# Patient Record
Sex: MALE | Race: WHITE | NOT HISPANIC OR LATINO | Employment: OTHER | URBAN - METROPOLITAN AREA
[De-identification: names, ages, dates, MRNs, and addresses within clinical notes are randomized per-mention and may not be internally consistent; named-entity substitution may affect disease eponyms.]

---

## 2019-12-04 ENCOUNTER — TELEPHONE (OUTPATIENT)
Dept: GASTROENTEROLOGY | Facility: CLINIC | Age: 84
End: 2019-12-04

## 2019-12-04 NOTE — TELEPHONE ENCOUNTER
Called pt's wife back, discussed changing the medication to Pepcid 20 mg as is taking the Zantac 150 mg   Pt's last ov was 7/2017 so recommened he make an ov first to discuss this and she is agreeable  Transferred to

## 2019-12-04 NOTE — TELEPHONE ENCOUNTER
Pt's wife left  mssg stating her luizabd got a ntc that Ranitidine was recalled; asks for [phone] to discuss what he can take in place of it

## 2019-12-19 ENCOUNTER — OFFICE VISIT (OUTPATIENT)
Dept: GASTROENTEROLOGY | Facility: CLINIC | Age: 84
End: 2019-12-19
Payer: MEDICARE

## 2019-12-19 VITALS
WEIGHT: 170 LBS | HEIGHT: 70 IN | HEART RATE: 94 BPM | DIASTOLIC BLOOD PRESSURE: 82 MMHG | BODY MASS INDEX: 24.34 KG/M2 | SYSTOLIC BLOOD PRESSURE: 128 MMHG

## 2019-12-19 DIAGNOSIS — K21.9 GASTROESOPHAGEAL REFLUX DISEASE WITHOUT ESOPHAGITIS: Primary | ICD-10-CM

## 2019-12-19 DIAGNOSIS — D75.89 MACROCYTOSIS: ICD-10-CM

## 2019-12-19 DIAGNOSIS — K22.70 BARRETT'S ESOPHAGUS WITHOUT DYSPLASIA: ICD-10-CM

## 2019-12-19 DIAGNOSIS — K29.50 OTHER CHRONIC GASTRITIS WITHOUT HEMORRHAGE: ICD-10-CM

## 2019-12-19 PROBLEM — I25.810 CORONARY ARTERY DISEASE INVOLVING AUTOLOGOUS ARTERY CORONARY BYPASS GRAFT WITHOUT ANGINA PECTORIS: Status: ACTIVE | Noted: 2019-12-19

## 2019-12-19 PROBLEM — K29.70 GASTRITIS: Status: ACTIVE | Noted: 2019-12-19

## 2019-12-19 PROCEDURE — 99214 OFFICE O/P EST MOD 30 MIN: CPT | Performed by: INTERNAL MEDICINE

## 2019-12-19 RX ORDER — ASPIRIN 81 MG/1
81 TABLET ORAL EVERY OTHER DAY
COMMUNITY

## 2019-12-19 RX ORDER — MELATONIN
1000 DAILY
COMMUNITY

## 2019-12-19 RX ORDER — ATORVASTATIN CALCIUM 10 MG/1
40 TABLET, FILM COATED ORAL DAILY
COMMUNITY

## 2019-12-19 RX ORDER — VERAPAMIL HYDROCHLORIDE 80 MG/1
240 TABLET ORAL DAILY
COMMUNITY

## 2019-12-19 RX ORDER — FAMOTIDINE 40 MG/1
40 TABLET, FILM COATED ORAL
Qty: 90 TABLET | Refills: 3 | Status: SHIPPED | OUTPATIENT
Start: 2019-12-19

## 2019-12-19 RX ORDER — LISINOPRIL 10 MG/1
10 TABLET ORAL DAILY
COMMUNITY

## 2019-12-19 RX ORDER — MULTIVITAMIN
1 CAPSULE ORAL DAILY
COMMUNITY

## 2019-12-19 RX ORDER — LEVOTHYROXINE SODIUM 0.1 MG/1
75 TABLET ORAL DAILY
COMMUNITY

## 2019-12-19 RX ORDER — OCTISALATE, AVOBENZONE, HOMOSALATE, AND OCTOCRYLENE 29.4; 29.4; 49; 25.48 MG/ML; MG/ML; MG/ML; MG/ML
LOTION TOPICAL EVERY OTHER DAY
COMMUNITY

## 2019-12-19 RX ORDER — PANTOPRAZOLE SODIUM 40 MG/1
40 TABLET, DELAYED RELEASE ORAL DAILY
COMMUNITY

## 2019-12-19 NOTE — PATIENT INSTRUCTIONS
[facility] - Outpatient Follow-up Note  Jason Tejeda 80 y o  male MRN: [id number]  Encounter: [id number]    ASSESSMENT AND PLAN:      1  Gastroesophageal reflux disease without esophagitis  -Patient with a history of gastroesophageal reflux and previous history of dysphagia  Overall he is doing well from that point of view  He is on pantoprazole 40 mg daily  He ran out of his Zantac and stopped taking it because of warnings about adverse effects or cancer promoting affects  Has not had worse symptoms since being off  Could consider just taking the famotidine as needed only    - famotidine (PEPCID) 40 MG tablet; Take 1 tablet (40 mg total) by mouth daily at bedtime as needed for heartburn  Dispense: 90 tablet; Refill: 3    2  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

## 2019-12-19 NOTE — LETTER
December 19, 2019     Lawrence Haile MD  [address]    Patient: Tsering Lane   YOB: 1932   Date of Visit: 12/19/2019       Dear Dr Rina Grigsby Recipients: Thank you for referring Joao Morin to me for evaluation  Below are my notes for this consultation  If you have questions, please do not hesitate to call me  I look forward to following your patient along with you  Sincerely,        Babita Almazan MD        CC: No Recipients  Babita Almazan MD  12/19/2019  3:38 PM  Incomplete  [address] [facility] - Outpatient Follow-up Note  Tsering Lane 80 y o  male MRN: [id number]  Encounter: [id number]    ASSESSMENT AND PLAN:      1  Gastroesophageal reflux disease without esophagitis  -Patient with a history of gastroesophageal reflux and previous history of dysphagia  Overall he is doing well from that point of view  He is on pantoprazole 40 mg daily  He ran out of his Zantac and stopped taking it because of warnings about adverse effects or cancer promoting affects  Has not had worse symptoms since being off  Could consider just taking the famotidine as needed only    - famotidine (PEPCID) 40 MG tablet; Take 1 tablet (40 mg total) by mouth daily at bedtime as needed for heartburn  Dispense: 90 tablet; Refill: 3    2  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  ______________________________________________________________________    Chief Complaint   Patient presents with    Med refill, needs meds changed from zantac, GERD     HPI:   Patient returns to the office for follow-up visit with his wife  We have been taking care of him for gastroesophageal reflux symptoms over many years  He does have a history of Webber's esophagus  He would have intermittent dysphagia previously but on last endoscopy he did not have a stricture  We did an empiric dilation any really does not have issues with swallowing at this time  He denies any issues with heartburn  He had been on ranitidine at night  He stopped taking it secondary to warnings about this being taken off the market  He really has not been any the worst for being off of it  He is continued on pantoprazole 40 mg daily  Reports his blood work is excellent  Memory is good  There is no kidney problems  Patient does report he has little bit of a sore back  He wants to know if he should take Tylenol or Advil for the back    Seems a little bit strained in the back on the right side    Historical Information   Past Medical History:   Diagnosis Date    Webber's esophagus     Coronary artery disease     Hyperlipidemia     Hypertension     Hypothyroid     Intestinal metaplasia of gastric mucosa      Past Surgical History:   Procedure Laterality Date    COLONOSCOPY  05/15/2007    CORONARY ARTERY BYPASS GRAFT  2011    Triple bypass    EGD  05/31/2016     Social History     Substance and Sexual Activity   Alcohol Use Not Currently     Social History     Substance and Sexual Activity   Drug Use Never     Social History     Tobacco Use   Smoking Status Never Smoker   Smokeless Tobacco Never Used     Family History   Problem Relation Age of Onset    Diabetes Mother     No Known Problems Father     Breast cancer Sister     Heart disease Sister     Breast cancer Sister     Heart disease Sister          Current Outpatient Medications:     aspirin (ECOTRIN LOW STRENGTH) 81 mg EC tablet   atorvastatin (LIPITOR) 10 mg tablet    cholecalciferol (VITAMIN D3) 1,000 units tablet    levothyroxine 100 mcg tablet    lisinopril (ZESTRIL) 10 mg tablet    Multiple Vitamin (MULTIVITAMIN) capsule    pantoprazole (PROTONIX) 40 mg tablet    Probiotic Product (ALIGN) 4 MG CAPS    verapamil (CALAN) 80 mg tablet    Wheat Dextrin (BENEFIBER DRINK MIX PO)    famotidine (PEPCID) 40 MG tablet  Allergies   Allergen Reactions    Ambien [Zolpidem]      Reviewed medications and allergies and updated as indicated    General negative for fatigue fever weight loss night sweats weight gain  ENT negative for sore throat hoarseness sinus pain  Respiratory negative for cough shortness of breath wheezing  Cardiovascular negative for chest pain irregular heartbeat palpitations  GI please see above  Heme positive for easy bruising  Musculoskeletal negative for joint pain swollen joints leg cramps positive for somewhat of a sore back   Skin negative for rash itching edema  Neurologic negative for seizures involuntary movements tremor tingling numbness   PsychNegative for anxiety depression sleeping problems    positive for urinary frequency or nocturia    PHYSICAL EXAM:    Blood pressure 128/82, pulse 94, height 5' 9 5" (1 765 m), weight 77 1 kg (170 lb)  Body mass index is 24 74 kg/m²  General Appearance: NAD, cooperative, alert  Eyes: Anicteric, PERRLA, EOMI  ENT:  Normocephalic, atraumatic, normal mucosa  Neck:  Supple, symmetrical, trachea midline  Resp:  Clear to auscultation bilaterally; no rales, rhonchi or wheezing; respirations unlabored   CV:  S1 S2, Regular rate and rhythm; no murmur, rub, or gallop  GI:  Soft, non-tender, non-distended; normal bowel sounds; no masses, no organomegaly   Rectal: Deferred  Musculoskeletal: No cyanosis, clubbing or edema  Normal ROM    Skin:  No jaundice, rashes, or lesions   Heme/Lymph: No palpable cervical lymphadenopathy  Psych: Normal affect, good eye contact  Neuro: No gross deficits, AAOx3

## 2019-12-19 NOTE — PROGRESS NOTES
[facility] - Outpatient Follow-up Note  Jackelyn Cough 80 y o  male MRN: [id number]  Encounter: [id number]    ASSESSMENT AND PLAN:      1  Gastroesophageal reflux disease without esophagitis  -Patient with a history of gastroesophageal reflux and previous history of dysphagia  Overall he is doing well from that point of view  He is on pantoprazole 40 mg daily  He ran out of his Zantac and stopped taking it because of warnings about adverse effects or cancer promoting affects  Has not had worse symptoms since being off  Could consider just taking the famotidine as needed only    - famotidine (PEPCID) 40 MG tablet; Take 1 tablet (40 mg total) by mouth daily at bedtime as needed for heartburn  Dispense: 90 tablet; Refill: 3    2  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   last EGD was 2016- given the fact that the patient is 80 and he is low risk  Will hold on endoscopy  If he begins having significant symptoms then he will call    3  Other chronic gastritis without hemorrhage  - patient with a history of intestinal metaplasia and H pylori gastritis  We did treat him with triple antibiotics  This was found on endoscopy 2016  Again will hold on any endoscopy  Recheck stool antigen for H pylori  - H  pylori antigen, stool; Future    4  Normal hemoglobin with slight elevation of  - lab obtained November 21, 2019  - will obtain vitamin B12 level and folate level      Followup Appointment:  1 year  ______________________________________________________________________    Chief Complaint   Patient presents with    Med refill, needs meds changed from zantac, GERD     HPI:   Patient returns to the office for follow-up visit with his wife  We have been taking care of him for gastroesophageal reflux symptoms over many years  He does have a history of Webber's esophagus  He would have intermittent dysphagia previously but on last endoscopy he did not have a stricture  We did an empiric dilation any really does not have issues with swallowing at this time  He denies any issues with heartburn  He had been on ranitidine at night  He stopped taking it secondary to warnings about this being taken off the market  He really has not been any the worst for being off of it  He is continued on pantoprazole 40 mg daily  Reports his blood work is excellent  Memory is good  There is no kidney problems  Patient does report he has little bit of a sore back  He wants to know if he should take Tylenol or Advil for the back    Seems a little bit strained in the back on the right side    Historical Information   Past Medical History:   Diagnosis Date    Webber's esophagus     Coronary artery disease     Hyperlipidemia     Hypertension     Hypothyroid     Intestinal metaplasia of gastric mucosa      Past Surgical History:   Procedure Laterality Date    COLONOSCOPY  05/15/2007    CORONARY ARTERY BYPASS GRAFT  2011    Triple bypass    EGD  05/31/2016     Social History     Substance and Sexual Activity   Alcohol Use Not Currently     Social History     Substance and Sexual Activity   Drug Use Never     Social History     Tobacco Use   Smoking Status Never Smoker   Smokeless Tobacco Never Used     Family History   Problem Relation Age of Onset    Diabetes Mother     No Known Problems Father     Breast cancer Sister     Heart disease Sister     Breast cancer Sister     Heart disease Sister          Current Outpatient Medications:     aspirin (ECOTRIN LOW STRENGTH) 81 mg EC tablet    atorvastatin (LIPITOR) 10 mg tablet    cholecalciferol (VITAMIN D3) 1,000 units tablet    levothyroxine 100 mcg tablet    lisinopril (ZESTRIL) 10 mg tablet    Multiple Vitamin (MULTIVITAMIN) capsule    pantoprazole (PROTONIX) 40 mg tablet    Probiotic Product (ALIGN) 4 MG CAPS    verapamil (CALAN) 80 mg tablet    Wheat Dextrin (BENEFIBER DRINK MIX PO)    famotidine (PEPCID) 40 MG tablet  Allergies   Allergen Reactions    Ambien [Zolpidem]      Reviewed medications and allergies and updated as indicated    General negative for fatigue fever weight loss night sweats weight gain  ENT negative for sore throat hoarseness sinus pain  Respiratory negative for cough shortness of breath wheezing  Cardiovascular negative for chest pain irregular heartbeat palpitations  GI please see above  Heme positive for easy bruising  Musculoskeletal negative for joint pain swollen joints leg cramps positive for somewhat of a sore back   Skin negative for rash itching edema  Neurologic negative for seizures involuntary movements tremor tingling numbness   PsychNegative for anxiety depression sleeping problems    positive for urinary frequency or nocturia    PHYSICAL EXAM:    Blood pressure 128/82, pulse 94, height 5' 9 5" (1 765 m), weight 77 1 kg (170 lb)  Body mass index is 24 74 kg/m²  General Appearance: NAD, cooperative, alert  Eyes: Anicteric, PERRLA, EOMI  ENT:  Normocephalic, atraumatic, normal mucosa  Neck:  Supple, symmetrical, trachea midline  Resp:  Clear to auscultation bilaterally; no rales, rhonchi or wheezing; respirations unlabored   CV:  S1 S2, Regular rate and rhythm; no murmur, rub, or gallop  GI:  Soft, non-tender, non-distended; normal bowel sounds; no masses, no organomegaly   Rectal: Deferred  Musculoskeletal: No cyanosis, clubbing or edema  Normal ROM  Skin:  No jaundice, rashes, or lesions   Heme/Lymph: No palpable cervical lymphadenopathy  Psych: Normal affect, good eye contact  Neuro: No gross deficits, AAOx3

## 2020-01-16 ENCOUNTER — TELEPHONE (OUTPATIENT)
Dept: GASTROENTEROLOGY | Facility: CLINIC | Age: 85
End: 2020-01-16

## 2020-01-16 NOTE — TELEPHONE ENCOUNTER
Message left for patient to call back  I reprinted the lab order so we can fax to [facility] after patient is notified

## 2020-12-31 ENCOUNTER — OFFICE VISIT (OUTPATIENT)
Dept: GASTROENTEROLOGY | Facility: CLINIC | Age: 85
End: 2020-12-31
Payer: MEDICARE

## 2020-12-31 VITALS
DIASTOLIC BLOOD PRESSURE: 76 MMHG | HEIGHT: 70 IN | SYSTOLIC BLOOD PRESSURE: 126 MMHG | BODY MASS INDEX: 24.34 KG/M2 | WEIGHT: 170 LBS | HEART RATE: 94 BPM

## 2020-12-31 DIAGNOSIS — K22.70 BARRETT'S ESOPHAGUS WITHOUT DYSPLASIA: Primary | ICD-10-CM

## 2020-12-31 DIAGNOSIS — I25.810 CORONARY ARTERY DISEASE INVOLVING AUTOLOGOUS ARTERY CORONARY BYPASS GRAFT WITHOUT ANGINA PECTORIS: ICD-10-CM

## 2020-12-31 DIAGNOSIS — K31.A0 INTESTINAL METAPLASIA OF GASTRIC MUCOSA: ICD-10-CM

## 2020-12-31 DIAGNOSIS — K21.9 GASTROESOPHAGEAL REFLUX DISEASE WITHOUT ESOPHAGITIS: ICD-10-CM

## 2020-12-31 PROCEDURE — 99213 OFFICE O/P EST LOW 20 MIN: CPT | Performed by: INTERNAL MEDICINE
